# Patient Record
Sex: FEMALE | Race: WHITE | Employment: FULL TIME | URBAN - METROPOLITAN AREA
[De-identification: names, ages, dates, MRNs, and addresses within clinical notes are randomized per-mention and may not be internally consistent; named-entity substitution may affect disease eponyms.]

---

## 2020-10-10 ENCOUNTER — TRANSCRIBE ORDER (OUTPATIENT)
Dept: REGISTRATION | Age: 40
End: 2020-10-10

## 2020-10-10 ENCOUNTER — HOSPITAL ENCOUNTER (OUTPATIENT)
Dept: PREADMISSION TESTING | Age: 40
Discharge: HOME | End: 2020-10-10
Payer: COMMERCIAL

## 2020-10-10 DIAGNOSIS — Z01.812 PRE-PROCEDURE LAB EXAM: ICD-10-CM

## 2020-10-10 DIAGNOSIS — Z01.812 PRE-PROCEDURE LAB EXAM: Primary | ICD-10-CM

## 2020-10-10 PROCEDURE — 87635 SARS-COV-2 COVID-19 AMP PRB: CPT

## 2020-10-11 LAB — SARS-COV-2, COV2NT: NOT DETECTED

## 2020-10-14 ENCOUNTER — ANESTHESIA (OUTPATIENT)
Dept: ENDOSCOPY | Age: 40
End: 2020-10-14
Payer: COMMERCIAL

## 2020-10-14 ENCOUNTER — ANESTHESIA EVENT (OUTPATIENT)
Dept: ENDOSCOPY | Age: 40
End: 2020-10-14
Payer: COMMERCIAL

## 2020-10-14 ENCOUNTER — HOSPITAL ENCOUNTER (OUTPATIENT)
Age: 40
Setting detail: OUTPATIENT SURGERY
Discharge: HOME | End: 2020-10-14
Attending: COLON & RECTAL SURGERY | Admitting: COLON & RECTAL SURGERY
Payer: COMMERCIAL

## 2020-10-14 VITALS
SYSTOLIC BLOOD PRESSURE: 120 MMHG | WEIGHT: 133 LBS | HEART RATE: 87 BPM | HEIGHT: 65 IN | BODY MASS INDEX: 22.16 KG/M2 | DIASTOLIC BLOOD PRESSURE: 76 MMHG

## 2020-10-14 PROCEDURE — 88305 TISSUE EXAM BY PATHOLOGIST: CPT

## 2020-10-14 PROCEDURE — 77030009426 HC FCPS BIOP ENDOSC BSC -B: Performed by: COLON & RECTAL SURGERY

## 2020-10-14 PROCEDURE — 76040000007: Performed by: COLON & RECTAL SURGERY

## 2020-10-14 PROCEDURE — 74011250636 HC RX REV CODE- 250/636: Performed by: NURSE ANESTHETIST, CERTIFIED REGISTERED

## 2020-10-14 PROCEDURE — 74011000250 HC RX REV CODE- 250: Performed by: NURSE ANESTHETIST, CERTIFIED REGISTERED

## 2020-10-14 PROCEDURE — 76060000032 HC ANESTHESIA 0.5 TO 1 HR: Performed by: COLON & RECTAL SURGERY

## 2020-10-14 PROCEDURE — 74011250637 HC RX REV CODE- 250/637: Performed by: COLON & RECTAL SURGERY

## 2020-10-14 PROCEDURE — 2709999900 HC NON-CHARGEABLE SUPPLY: Performed by: COLON & RECTAL SURGERY

## 2020-10-14 RX ORDER — EPINEPHRINE 0.1 MG/ML
1 INJECTION INTRACARDIAC; INTRAVENOUS
Status: DISCONTINUED | OUTPATIENT
Start: 2020-10-14 | End: 2020-10-14 | Stop reason: HOSPADM

## 2020-10-14 RX ORDER — SODIUM CHLORIDE 0.9 % (FLUSH) 0.9 %
5-40 SYRINGE (ML) INJECTION AS NEEDED
Status: DISCONTINUED | OUTPATIENT
Start: 2020-10-14 | End: 2020-10-14 | Stop reason: HOSPADM

## 2020-10-14 RX ORDER — SODIUM CHLORIDE 9 MG/ML
INJECTION, SOLUTION INTRAVENOUS
Status: DISCONTINUED | OUTPATIENT
Start: 2020-10-14 | End: 2020-10-14 | Stop reason: HOSPADM

## 2020-10-14 RX ORDER — PROPOFOL 10 MG/ML
INJECTION, EMULSION INTRAVENOUS AS NEEDED
Status: DISCONTINUED | OUTPATIENT
Start: 2020-10-14 | End: 2020-10-14 | Stop reason: HOSPADM

## 2020-10-14 RX ORDER — ATROPINE SULFATE 0.1 MG/ML
0.5 INJECTION INTRAVENOUS
Status: DISCONTINUED | OUTPATIENT
Start: 2020-10-14 | End: 2020-10-14 | Stop reason: HOSPADM

## 2020-10-14 RX ORDER — SODIUM CHLORIDE 9 MG/ML
100 INJECTION, SOLUTION INTRAVENOUS CONTINUOUS
Status: DISCONTINUED | OUTPATIENT
Start: 2020-10-14 | End: 2020-10-14 | Stop reason: HOSPADM

## 2020-10-14 RX ORDER — SODIUM CHLORIDE 0.9 % (FLUSH) 0.9 %
5-40 SYRINGE (ML) INJECTION EVERY 8 HOURS
Status: DISCONTINUED | OUTPATIENT
Start: 2020-10-14 | End: 2020-10-14 | Stop reason: HOSPADM

## 2020-10-14 RX ORDER — MIDAZOLAM HYDROCHLORIDE 1 MG/ML
.25-5 INJECTION, SOLUTION INTRAMUSCULAR; INTRAVENOUS
Status: DISCONTINUED | OUTPATIENT
Start: 2020-10-14 | End: 2020-10-14 | Stop reason: HOSPADM

## 2020-10-14 RX ORDER — NALOXONE HYDROCHLORIDE 0.4 MG/ML
0.4 INJECTION, SOLUTION INTRAMUSCULAR; INTRAVENOUS; SUBCUTANEOUS
Status: DISCONTINUED | OUTPATIENT
Start: 2020-10-14 | End: 2020-10-14 | Stop reason: HOSPADM

## 2020-10-14 RX ORDER — FENTANYL CITRATE 50 UG/ML
12.5-25 INJECTION, SOLUTION INTRAMUSCULAR; INTRAVENOUS
Status: DISCONTINUED | OUTPATIENT
Start: 2020-10-14 | End: 2020-10-14 | Stop reason: HOSPADM

## 2020-10-14 RX ORDER — LIDOCAINE HYDROCHLORIDE 20 MG/ML
INJECTION, SOLUTION EPIDURAL; INFILTRATION; INTRACAUDAL; PERINEURAL AS NEEDED
Status: DISCONTINUED | OUTPATIENT
Start: 2020-10-14 | End: 2020-10-14 | Stop reason: HOSPADM

## 2020-10-14 RX ORDER — DEXTROMETHORPHAN/PSEUDOEPHED 2.5-7.5/.8
1.2 DROPS ORAL
Status: DISCONTINUED | OUTPATIENT
Start: 2020-10-14 | End: 2020-10-14 | Stop reason: HOSPADM

## 2020-10-14 RX ORDER — FLUMAZENIL 0.1 MG/ML
0.2 INJECTION INTRAVENOUS
Status: DISCONTINUED | OUTPATIENT
Start: 2020-10-14 | End: 2020-10-14 | Stop reason: HOSPADM

## 2020-10-14 RX ADMIN — PROPOFOL 50 MG: 10 INJECTION, EMULSION INTRAVENOUS at 09:12

## 2020-10-14 RX ADMIN — PROPOFOL 50 MG: 10 INJECTION, EMULSION INTRAVENOUS at 09:20

## 2020-10-14 RX ADMIN — PROPOFOL 50 MG: 10 INJECTION, EMULSION INTRAVENOUS at 09:30

## 2020-10-14 RX ADMIN — PROPOFOL 50 MG: 10 INJECTION, EMULSION INTRAVENOUS at 09:36

## 2020-10-14 RX ADMIN — PROPOFOL 50 MG: 10 INJECTION, EMULSION INTRAVENOUS at 09:18

## 2020-10-14 RX ADMIN — PROPOFOL 50 MG: 10 INJECTION, EMULSION INTRAVENOUS at 09:34

## 2020-10-14 RX ADMIN — SODIUM CHLORIDE: 900 INJECTION, SOLUTION INTRAVENOUS at 09:46

## 2020-10-14 RX ADMIN — PROPOFOL 50 MG: 10 INJECTION, EMULSION INTRAVENOUS at 09:23

## 2020-10-14 RX ADMIN — PROPOFOL 50 MG: 10 INJECTION, EMULSION INTRAVENOUS at 09:16

## 2020-10-14 RX ADMIN — LIDOCAINE HYDROCHLORIDE 50 MG: 20 INJECTION, SOLUTION EPIDURAL; INFILTRATION; INTRACAUDAL; PERINEURAL at 09:12

## 2020-10-14 RX ADMIN — PROPOFOL 50 MG: 10 INJECTION, EMULSION INTRAVENOUS at 09:28

## 2020-10-14 RX ADMIN — PROPOFOL 50 MG: 10 INJECTION, EMULSION INTRAVENOUS at 09:14

## 2020-10-14 RX ADMIN — PROPOFOL 50 MG: 10 INJECTION, EMULSION INTRAVENOUS at 09:26

## 2020-10-14 RX ADMIN — PROPOFOL 50 MG: 10 INJECTION, EMULSION INTRAVENOUS at 09:43

## 2020-10-14 RX ADMIN — PROPOFOL 50 MG: 10 INJECTION, EMULSION INTRAVENOUS at 09:13

## 2020-10-14 RX ADMIN — PROPOFOL 50 MG: 10 INJECTION, EMULSION INTRAVENOUS at 09:22

## 2020-10-14 RX ADMIN — PROPOFOL 50 MG: 10 INJECTION, EMULSION INTRAVENOUS at 09:25

## 2020-10-14 RX ADMIN — PROPOFOL 50 MG: 10 INJECTION, EMULSION INTRAVENOUS at 09:32

## 2020-10-14 RX ADMIN — SODIUM CHLORIDE: 900 INJECTION, SOLUTION INTRAVENOUS at 09:04

## 2020-10-14 RX ADMIN — PROPOFOL 50 MG: 10 INJECTION, EMULSION INTRAVENOUS at 09:38

## 2020-10-14 NOTE — H&P
History and Physical (outpatient)    Patient: Puneet Hewitt 35 y.o. female     Chief Complaint: No chief complaint on file. History of Present Illness: The patient is a 43-year-old female upon whom I performed a colonoscopy on 2009. The indications for the procedure were rectal bleeding, a change in bowel pattern, abdominal discomfort, and abnormal weight loss, and it was only remarkable for enlarged internal hemorrhoids to which the bleeding was attributed. She still has occasional bleeding onto the toilet paper with bowel movements. Her bowel pattern is sometimes irregular, but she usually moves her bowels once per day. She strains with approximately 25% of her bowel movements, but she has no pain. Her family history is significant for her maternal grandmother having  of cecal cancer at age 39. Her mother has had multiple colonoscopies and has had colonic polyps excised. History:  Past Medical History:   Diagnosis Date    Anemia NEC     with first pregnancy    Unspecified epilepsy without mention of intractable epilepsy     possible seizure after vaginal delivery    Vaginal delivery     Three times. Past Surgical History:   Procedure Laterality Date    HX COLONOSCOPY  2009    No neoplasms; Dr. Bro Peacock.     HX OTHER SURGICAL      wisdom teeth    HX OTHER SURGICAL      tonsillectomy    HX OTHER SURGICAL      adenoids    HX OTHER SURGICAL      lasix       Family History   Problem Relation Age of Onset    Thyroid Disease Mother     Anxiety Father     Hypertension Father     Colon Cancer Maternal Grandmother      Social History     Socioeconomic History    Marital status:      Spouse name: Not on file    Number of children: Not on file    Years of education: Not on file    Highest education level: Not on file   Occupational History    Not on file   Social Needs    Financial resource strain: Not on file   [facility] insecurity     Worry: Not on file     Inability: Not on file    Transportation needs     Medical: Not on file     Non-medical: Not on file   Tobacco Use    Smoking status: Former Smoker     Packs/day: 0.25     Years: 12.00     Pack years: 3.00     Last attempt to quit: 5/1/2010     Years since quitting: 10.4    Smokeless tobacco: Never Used   Substance and Sexual Activity    Alcohol use: Yes     Comment: 1-2 glasses of wine per day at the most    Drug use: Yes     Types: Marijuana     Comment: Blase Hussain in past last use almost a year ago    Sexual activity: Yes     Partners: Male     Birth control/protection: None   Lifestyle    Physical activity     Days per week: Not on file     Minutes per session: Not on file    Stress: Not on file   Relationships    Social connections     Talks on phone: Not on file     Gets together: Not on file     Attends Evangelical service: Not on file     Active member of club or organization: Not on file     Attends meetings of clubs or organizations: Not on file     Relationship status: Not on file    Intimate partner violence     Fear of current or ex partner: Not on file     Emotionally abused: Not on file     Physically abused: Not on file     Forced sexual activity: Not on file   Other Topics Concern    Not on file   Social History Narrative    Not on file       Allergies: No Known Allergies    Current Medications:  Cannot display prior to admission medications because the patient has not been admitted in this contact. No current facility-administered medications for this encounter. No current outpatient medications on file. Physical Exam:  unknown if currently breastfeeding. GENERAL:  No apparent distress. LUNGS:  Clear to auscultation bilaterally. HEART:  Regular rate and rhythm with no murmurs, gallops, or rubs. ABDOMEN: Soft, non-tender, and non-distended. NEUROLOGIC: Alert and oriented. No gross deficits.       Alerts:      Laboratory:    No results for input(s): HGB, HCT, WBC, PLT, INR, BUN, CREA, K, CRCLT, HGBEXT, HCTEXT, PLTEXT, INREXT in the last 72 hours. No lab exists for component: PTT, PT    Assessment:  Another colonoscopy is indicated for re-evaluation of the rectal bleeding in light of the family history of colon cancer and polyps. Plan:  The risks, benefits, and alternatives were reviewed with the patient, and she has agreed to proceed with the procedure. The plan for this patient includes MAC.

## 2020-10-14 NOTE — PROCEDURES
Matilda Michelle  [id number]  1980    Colonoscopy Operative Report    Procedure Type:   Colonoscopy with hot forceps polypectomy. Pre-operative Diagnosis:  Rectal bleeding. Family history of colon cancer. Family history of colonic polyps. Post-operative Diagnosis:  Colonic polyp. Hemorrhoids. Surgeon:  Yesy Young MD    Assistant:  None    Sedation and Analgesia:  MAC anesthesia Propofol  (850 mg)    Specimens Removed:  Polyp. EBL:  0 mL. Complications: None apparent. Implants:  None. Indication For Procedure: The patient is a 79-year-old female upon whom I performed a colonoscopy on 2009. The indications for the procedure were rectal bleeding, a change in bowel pattern, abdominal discomfort, and abnormal weight loss, and it was only remarkable for enlarged internal hemorrhoids to which the bleeding was attributed. She still has occasional bleeding onto the toilet paper with bowel movements. Her bowel pattern is sometimes irregular, but she usually moves her bowels once per day. She strains with approximately 25% of her bowel movements, but she has no pain. Her family history is significant for her maternal grandmother having  of cecal cancer at age 39. Her mother has had multiple colonoscopies and has had colonic polyps excised. Another colonoscopy is indicated for re-evaluation of the rectal bleeding in light of the family history of colon cancer and polyps. Findings: There was a diminutive polyp located in the region of the splenic flexure. Hemorrhoids were somewhat enlarged. Procedure Details:  After informed consent was obtained, the patient was taken to the endoscopy suite where standard monitoring devices were attached and intravenous access was established. Sedation was administered by the anesthetist as needed throughout the procedure.   The patient was placed in the left lateral decubitus position, and inspection of the perineum revealed no significant external lesions. Digital rectal examination revealed no masses. The Olympus videocolonoscope was lubricated and inserted transanally into the rectum. It was advanced into the colon and without difficulty to the cecum, which was identified by the presence of the ileocecal valve and the appendiceal orifice. The quality of the bowel preparation was good, as was the overall visualization. Careful inspection was performed during withdrawal of the colonoscope. The polyp noted above was excised using the hot forceps with excellent hemostasis. There were no apparent complications, and the patient appeared to have tolerated the procedure well. At its conclusion, she was transported to the recovery area in good condition. Impression:    The polyp appeared to have been benign. The occasional bleeding can now be safely attributed to the hemorrhoids. Recommendations:   I will contact the patient with the pathology results and make a follow-up recommendation at that time.

## 2020-10-14 NOTE — PERIOP NOTES
3377 Procedure being performed under MAC; YASMEEN Griffin at bedside monitoring patient. See anesthesia notes. 3487 Endoscope was pre-cleaned at bedside immediately following procedure by Tod Kevin. 4768 Patient received Lidocaine and Propofol, per YASMEEN Griffin. Care of patient assumed from the anesthesia provider. Patient tolerated procedure well. Abdomen remains soft and non tender post procedure, no complaints or indication of discomfort noted at this time. See anesthesia note. Patient transferred to Endoscopy Recovery and report given to recovery nurse.

## 2020-10-14 NOTE — ROUTINE PROCESS
Alessandro Mason 1980 
[id number] Situation: 
Verbal report received from: KARELY Lovell Procedure: Procedure(s): 
COLONOSCOPY    :- 
ENDOSCOPIC POLYPECTOMY Background: 
 
Preoperative diagnosis: SCREENING AND FAMILY HISTORY OF COLON CANCER Postoperative diagnosis: Splenic flexure polyp removed by hot bx :  Dr. Cathaleen Kocher Assistant(s): Endoscopy Technician-1: Swetha Brown Endoscopy RN-1: Ewelina Wilson RN Specimens:  
ID Type Source Tests Collected by Time Destination 1 : Splenic flexure polyp Preservative   Ozzy Hong MD 10/14/2020 0216 Pathology H. Pylori  no Assessment: 
Intra-procedure medications Anesthesia gave intra-procedure sedation and medications, see anesthesia flow sheet yes Intravenous fluids: 200 NS @ Cari Saul Vital signs stable yes Abdominal assessment: round and soft yes Recommendation: 
Discharge patient per MD order yes. Return to floor no Family or Friend :  Permission to share finding with family or friend yes

## 2020-10-14 NOTE — DISCHARGE INSTRUCTIONS
Jaki Fat, [address] [facility] [facility], [facility]  [phone]      Post-Polypectomy Instructions    1. Do not drive, operate dangerous machinery, sign legal documents, or conduct any important business for the rest of the day. 2. Avoid strenuous exercise for the next 10 days. 3. Avoid straining when you move your bowels. 4. Do not take any aspirin, aspirin-containing products, ibuprofen (Advil, Motrin, etc.), or Aleve for the next two weeks. You may take Tylenol as directed on the bottle if needed. 5. You may begin with a light diet today then advance to your usual diet as tolerated. Do not drink alcohol for the rest of the day. 6. If you notice blood in your stool for more than one or two bowel movements following the procedure, if you develop abdominal pain (aside from gas cramps on the day of the procedure), or if you develop a fever with a temperature of 101.0 or higher, call my office. 7. If you do not have a bowel movement within the next two days, call my office. 8. If you have any other problems or questions, please call my office. 9. Findings and Follow-up:  There was no cancer. There was one small benign-appearing polyp that we removed. The bleeding can now be safely attributed to your hemorrhoids, and nothing needs to be done if it does not occur very frequently. I will discuss the pathology results with you when they are available, and I will make a follow-up recommendation at that time. Please call my office if you have not heard from me by Monday 10/19/2020.

## 2020-10-14 NOTE — ANESTHESIA POSTPROCEDURE EVALUATION
Post-Anesthesia Evaluation and Assessment    Patient: Blas Lee MRN: [id number]  SSN: [id number]    YOB: 1980  Age: 35 y.o. Sex: female      I have evaluated the patient and they are stable and ready for discharge from the PACU. Cardiovascular Function/Vital Signs  Visit Vitals  /76   Pulse 87   Resp (!) 0   Ht 5' 5\" (1.651 m)   Wt 60.3 kg (133 lb)   SpO2 (!) 0%   Breastfeeding No   BMI 22.13 kg/m²       Patient is status post MAC anesthesia for Procedure(s):  COLONOSCOPY    :-  ENDOSCOPIC POLYPECTOMY. Nausea/Vomiting: None    Postoperative hydration reviewed and adequate. Pain:  Pain Scale 1: Numeric (0 - 10) (10/14/20 1016)  Pain Intensity 1: 0 (10/14/20 1016)   Managed    Neurological Status: At baseline    Mental Status, Level of Consciousness: Alert and  oriented to person, place, and time    Pulmonary Status:   O2 Device: Room air (10/14/20 1016)   Adequate oxygenation and airway patent    Complications related to anesthesia: None    Post-anesthesia assessment completed. No concerns    Signed By: Kriss Garcia MD     October 14, 2020              Procedure(s):  COLONOSCOPY    :-  ENDOSCOPIC POLYPECTOMY. MAC    <[email or web address]>    INITIAL Post-op Vital signs:   Vitals Value Taken Time   BP 0/0 10/14/2020 10:26 AM   Temp     Pulse 75 10/14/2020 10:34 AM   Resp 15 10/14/2020 10:34 AM   SpO2 98 % 10/14/2020 10:34 AM   Vitals shown include unvalidated device data.

## 2020-10-14 NOTE — ANESTHESIA PREPROCEDURE EVALUATION
Relevant Problems   No relevant active problems       Anesthetic History   No history of anesthetic complications            Review of Systems / Medical History  Patient summary reviewed, nursing notes reviewed and pertinent labs reviewed    Pulmonary  Within defined limits                 Neuro/Psych   Within defined limits           Cardiovascular  Within defined limits                Exercise tolerance: >4 METS     GI/Hepatic/Renal  Within defined limits             Comments: screeening Endo/Other  Within defined limits           Other Findings              Physical Exam    Airway  Mallampati: II  TM Distance: > 6 cm  Neck ROM: normal range of motion   Mouth opening: Normal     Cardiovascular  Regular rate and rhythm,  S1 and S2 normal,  no murmur, click, rub, or gallop             Dental  No notable dental hx       Pulmonary  Breath sounds clear to auscultation               Abdominal  GI exam deferred       Other Findings            Anesthetic Plan    ASA: 1  Anesthesia type: MAC          Induction: Intravenous  Anesthetic plan and risks discussed with: Patient

## 2021-03-25 ENCOUNTER — TRANSCRIBE ORDER (OUTPATIENT)
Dept: SCHEDULING | Age: 41
End: 2021-03-25

## 2021-03-25 DIAGNOSIS — Z12.31 VISIT FOR SCREENING MAMMOGRAM: Primary | ICD-10-CM

## 2021-04-13 ENCOUNTER — HOSPITAL ENCOUNTER (OUTPATIENT)
Dept: MAMMOGRAPHY | Age: 41
Discharge: HOME | End: 2021-04-13
Attending: OBSTETRICS & GYNECOLOGY
Payer: COMMERCIAL

## 2021-04-13 DIAGNOSIS — Z12.31 VISIT FOR SCREENING MAMMOGRAM: ICD-10-CM

## 2021-04-13 PROCEDURE — 77067 SCR MAMMO BI INCL CAD: CPT

## 2022-03-16 ENCOUNTER — TRANSCRIBE ORDER (OUTPATIENT)
Dept: SCHEDULING | Age: 42
End: 2022-03-16

## 2022-03-16 DIAGNOSIS — Z12.31 VISIT FOR SCREENING MAMMOGRAM: Primary | ICD-10-CM

## 2022-04-19 ENCOUNTER — HOSPITAL ENCOUNTER (OUTPATIENT)
Dept: MAMMOGRAPHY | Age: 42
Discharge: HOME | End: 2022-04-19
Attending: OBSTETRICS & GYNECOLOGY
Payer: COMMERCIAL

## 2022-04-19 DIAGNOSIS — Z12.31 VISIT FOR SCREENING MAMMOGRAM: ICD-10-CM

## 2022-04-19 PROCEDURE — 77067 SCR MAMMO BI INCL CAD: CPT

## 2023-06-05 ENCOUNTER — HOSPITAL ENCOUNTER (OUTPATIENT)
Facility: HOSPITAL | Age: 43
Discharge: HOME | End: 2023-06-08
Payer: COMMERCIAL

## 2023-06-05 DIAGNOSIS — Z12.31 VISIT FOR SCREENING MAMMOGRAM: ICD-10-CM

## 2023-06-05 PROCEDURE — 77063 BREAST TOMOSYNTHESIS BI: CPT

## 2024-07-24 ENCOUNTER — HOSPITAL ENCOUNTER (OUTPATIENT)
Facility: HOSPITAL | Age: 44
Discharge: HOME | End: 2024-07-27
Payer: COMMERCIAL

## 2024-07-24 VITALS — BODY MASS INDEX: 19.29 KG/M2 | HEIGHT: 66 IN | WEIGHT: 120 LBS

## 2024-07-24 DIAGNOSIS — Z12.31 VISIT FOR SCREENING MAMMOGRAM: ICD-10-CM

## 2024-07-24 PROCEDURE — 77063 BREAST TOMOSYNTHESIS BI: CPT

## 2025-06-11 ENCOUNTER — TRANSCRIBE ORDERS (OUTPATIENT)
Facility: HOSPITAL | Age: 45
End: 2025-06-11

## 2025-06-11 DIAGNOSIS — Z12.31 VISIT FOR SCREENING MAMMOGRAM: Primary | ICD-10-CM

## 2025-07-28 ENCOUNTER — HOSPITAL ENCOUNTER (OUTPATIENT)
Facility: HOSPITAL | Age: 45
Discharge: HOME | End: 2025-07-31
Attending: OBSTETRICS & GYNECOLOGY
Payer: COMMERCIAL

## 2025-07-28 DIAGNOSIS — Z12.31 VISIT FOR SCREENING MAMMOGRAM: ICD-10-CM

## 2025-07-28 PROCEDURE — 77063 BREAST TOMOSYNTHESIS BI: CPT

## (undated) DEVICE — REM POLYHESIVE ADULT PATIENT RETURN ELECTRODE: Brand: VALLEYLAB

## (undated) DEVICE — FCPS BX HOT RJ4 2.2MMX240CM -- RADIAL JAW 4 BX/40